# Patient Record
Sex: MALE | Race: BLACK OR AFRICAN AMERICAN | NOT HISPANIC OR LATINO | URBAN - METROPOLITAN AREA
[De-identification: names, ages, dates, MRNs, and addresses within clinical notes are randomized per-mention and may not be internally consistent; named-entity substitution may affect disease eponyms.]

---

## 2024-01-28 ENCOUNTER — HOSPITAL ENCOUNTER (EMERGENCY)
Facility: HOSPITAL | Age: 54
Discharge: LEFT BEFORE DISCHARGE | End: 2024-01-28
Payer: COMMERCIAL

## 2024-01-28 VITALS
OXYGEN SATURATION: 95 % | BODY MASS INDEX: 33.33 KG/M2 | DIASTOLIC BLOOD PRESSURE: 107 MMHG | HEIGHT: 69 IN | SYSTOLIC BLOOD PRESSURE: 196 MMHG | WEIGHT: 225 LBS | HEART RATE: 87 BPM | RESPIRATION RATE: 22 BRPM | TEMPERATURE: 98.1 F

## 2024-01-28 PROCEDURE — 4500999001 HC ED NO CHARGE

## 2024-01-28 ASSESSMENT — COLUMBIA-SUICIDE SEVERITY RATING SCALE - C-SSRS
6. HAVE YOU EVER DONE ANYTHING, STARTED TO DO ANYTHING, OR PREPARED TO DO ANYTHING TO END YOUR LIFE?: NO
1. IN THE PAST MONTH, HAVE YOU WISHED YOU WERE DEAD OR WISHED YOU COULD GO TO SLEEP AND NOT WAKE UP?: NO
2. HAVE YOU ACTUALLY HAD ANY THOUGHTS OF KILLING YOURSELF?: NO

## 2024-01-28 NOTE — ED TRIAGE NOTES
PT presents to ED via triage for chief complaint of SOB. PT states he developed SOB 1 hour prior to arrival. PT denies chest pain. PT also endorsing left eye photophobia. PT declining to answer RN questions in triage. PT Aox4 and ambulates on his own.

## 2024-03-09 ENCOUNTER — HOSPITAL ENCOUNTER (EMERGENCY)
Facility: HOSPITAL | Age: 54
Discharge: HOME | End: 2024-03-09
Attending: EMERGENCY MEDICINE
Payer: COMMERCIAL

## 2024-03-09 ENCOUNTER — APPOINTMENT (OUTPATIENT)
Dept: RADIOLOGY | Facility: HOSPITAL | Age: 54
End: 2024-03-09
Payer: COMMERCIAL

## 2024-03-09 ENCOUNTER — CLINICAL SUPPORT (OUTPATIENT)
Dept: EMERGENCY MEDICINE | Facility: HOSPITAL | Age: 54
End: 2024-03-09
Payer: COMMERCIAL

## 2024-03-09 VITALS
TEMPERATURE: 97.8 F | BODY MASS INDEX: 29.4 KG/M2 | DIASTOLIC BLOOD PRESSURE: 83 MMHG | SYSTOLIC BLOOD PRESSURE: 169 MMHG | HEIGHT: 71 IN | WEIGHT: 210 LBS | HEART RATE: 60 BPM | RESPIRATION RATE: 16 BRPM | OXYGEN SATURATION: 97 %

## 2024-03-09 DIAGNOSIS — S01.81XA FACE LACERATIONS, INITIAL ENCOUNTER: Primary | ICD-10-CM

## 2024-03-09 LAB
ALBUMIN SERPL BCP-MCNC: 4 G/DL (ref 3.4–5)
ALP SERPL-CCNC: 61 U/L (ref 33–120)
ALT SERPL W P-5'-P-CCNC: 37 U/L (ref 10–52)
ANION GAP SERPL CALC-SCNC: 13 MMOL/L (ref 10–20)
APAP SERPL-MCNC: <10 UG/ML
AST SERPL W P-5'-P-CCNC: 37 U/L (ref 9–39)
BASOPHILS # BLD AUTO: 0.04 X10*3/UL (ref 0–0.1)
BASOPHILS NFR BLD AUTO: 0.7 %
BILIRUB SERPL-MCNC: 0.3 MG/DL (ref 0–1.2)
BUN SERPL-MCNC: 17 MG/DL (ref 6–23)
CALCIUM SERPL-MCNC: 8.7 MG/DL (ref 8.6–10.6)
CHLORIDE SERPL-SCNC: 102 MMOL/L (ref 98–107)
CO2 SERPL-SCNC: 25 MMOL/L (ref 21–32)
CREAT SERPL-MCNC: 1.21 MG/DL (ref 0.5–1.3)
EGFRCR SERPLBLD CKD-EPI 2021: 72 ML/MIN/1.73M*2
EOSINOPHIL # BLD AUTO: 0.11 X10*3/UL (ref 0–0.7)
EOSINOPHIL NFR BLD AUTO: 1.9 %
ERYTHROCYTE [DISTWIDTH] IN BLOOD BY AUTOMATED COUNT: 13.3 % (ref 11.5–14.5)
ETHANOL SERPL-MCNC: 455 MG/DL
GLUCOSE BLD MANUAL STRIP-MCNC: 115 MG/DL (ref 74–99)
GLUCOSE SERPL-MCNC: 115 MG/DL (ref 74–99)
HCT VFR BLD AUTO: 43.1 % (ref 41–52)
HGB BLD-MCNC: 15.3 G/DL (ref 13.5–17.5)
IMM GRANULOCYTES # BLD AUTO: 0.01 X10*3/UL (ref 0–0.7)
IMM GRANULOCYTES NFR BLD AUTO: 0.2 % (ref 0–0.9)
LYMPHOCYTES # BLD AUTO: 2.76 X10*3/UL (ref 1.2–4.8)
LYMPHOCYTES NFR BLD AUTO: 48.6 %
MCH RBC QN AUTO: 30.9 PG (ref 26–34)
MCHC RBC AUTO-ENTMCNC: 35.5 G/DL (ref 32–36)
MCV RBC AUTO: 87 FL (ref 80–100)
MONOCYTES # BLD AUTO: 0.44 X10*3/UL (ref 0.1–1)
MONOCYTES NFR BLD AUTO: 7.7 %
NEUTROPHILS # BLD AUTO: 2.32 X10*3/UL (ref 1.2–7.7)
NEUTROPHILS NFR BLD AUTO: 40.9 %
NRBC BLD-RTO: 0 /100 WBCS (ref 0–0)
PLATELET # BLD AUTO: 236 X10*3/UL (ref 150–450)
POTASSIUM SERPL-SCNC: 2.9 MMOL/L (ref 3.5–5.3)
PROT SERPL-MCNC: 7.3 G/DL (ref 6.4–8.2)
RBC # BLD AUTO: 4.95 X10*6/UL (ref 4.5–5.9)
SALICYLATES SERPL-MCNC: <3 MG/DL
SODIUM SERPL-SCNC: 137 MMOL/L (ref 136–145)
WBC # BLD AUTO: 5.7 X10*3/UL (ref 4.4–11.3)

## 2024-03-09 PROCEDURE — 76376 3D RENDER W/INTRP POSTPROCES: CPT

## 2024-03-09 PROCEDURE — 90715 TDAP VACCINE 7 YRS/> IM: CPT | Mod: SE

## 2024-03-09 PROCEDURE — 2500000005 HC RX 250 GENERAL PHARMACY W/O HCPCS: Mod: SE | Performed by: STUDENT IN AN ORGANIZED HEALTH CARE EDUCATION/TRAINING PROGRAM

## 2024-03-09 PROCEDURE — 74177 CT ABD & PELVIS W/CONTRAST: CPT

## 2024-03-09 PROCEDURE — 72125 CT NECK SPINE W/O DYE: CPT

## 2024-03-09 PROCEDURE — 82947 ASSAY GLUCOSE BLOOD QUANT: CPT

## 2024-03-09 PROCEDURE — 82947 ASSAY GLUCOSE BLOOD QUANT: CPT | Mod: 59

## 2024-03-09 PROCEDURE — 2500000001 HC RX 250 WO HCPCS SELF ADMINISTERED DRUGS (ALT 637 FOR MEDICARE OP): Mod: SE | Performed by: STUDENT IN AN ORGANIZED HEALTH CARE EDUCATION/TRAINING PROGRAM

## 2024-03-09 PROCEDURE — 72131 CT LUMBAR SPINE W/O DYE: CPT | Mod: RCN

## 2024-03-09 PROCEDURE — 72128 CT CHEST SPINE W/O DYE: CPT | Mod: RCN

## 2024-03-09 PROCEDURE — 2550000001 HC RX 255 CONTRASTS: Mod: SE | Performed by: EMERGENCY MEDICINE

## 2024-03-09 PROCEDURE — 70486 CT MAXILLOFACIAL W/O DYE: CPT

## 2024-03-09 PROCEDURE — 2500000004 HC RX 250 GENERAL PHARMACY W/ HCPCS (ALT 636 FOR OP/ED): Mod: SE | Performed by: STUDENT IN AN ORGANIZED HEALTH CARE EDUCATION/TRAINING PROGRAM

## 2024-03-09 PROCEDURE — 80143 DRUG ASSAY ACETAMINOPHEN: CPT | Performed by: STUDENT IN AN ORGANIZED HEALTH CARE EDUCATION/TRAINING PROGRAM

## 2024-03-09 PROCEDURE — 85025 COMPLETE CBC W/AUTO DIFF WBC: CPT | Performed by: STUDENT IN AN ORGANIZED HEALTH CARE EDUCATION/TRAINING PROGRAM

## 2024-03-09 PROCEDURE — 2500000004 HC RX 250 GENERAL PHARMACY W/ HCPCS (ALT 636 FOR OP/ED): Mod: SE

## 2024-03-09 PROCEDURE — 99285 EMERGENCY DEPT VISIT HI MDM: CPT | Mod: 25

## 2024-03-09 PROCEDURE — 74177 CT ABD & PELVIS W/CONTRAST: CPT | Performed by: RADIOLOGY

## 2024-03-09 PROCEDURE — 90471 IMMUNIZATION ADMIN: CPT

## 2024-03-09 PROCEDURE — 36415 COLL VENOUS BLD VENIPUNCTURE: CPT | Performed by: STUDENT IN AN ORGANIZED HEALTH CARE EDUCATION/TRAINING PROGRAM

## 2024-03-09 PROCEDURE — 70450 CT HEAD/BRAIN W/O DYE: CPT

## 2024-03-09 PROCEDURE — 80053 COMPREHEN METABOLIC PANEL: CPT | Performed by: STUDENT IN AN ORGANIZED HEALTH CARE EDUCATION/TRAINING PROGRAM

## 2024-03-09 PROCEDURE — 12013 RPR F/E/E/N/L/M 2.6-5.0 CM: CPT | Performed by: STUDENT IN AN ORGANIZED HEALTH CARE EDUCATION/TRAINING PROGRAM

## 2024-03-09 PROCEDURE — 93005 ELECTROCARDIOGRAM TRACING: CPT | Mod: 59

## 2024-03-09 PROCEDURE — 2500000001 HC RX 250 WO HCPCS SELF ADMINISTERED DRUGS (ALT 637 FOR MEDICARE OP): Mod: SE | Performed by: EMERGENCY MEDICINE

## 2024-03-09 PROCEDURE — 12013 RPR F/E/E/N/L/M 2.6-5.0 CM: CPT | Performed by: EMERGENCY MEDICINE

## 2024-03-09 PROCEDURE — 99285 EMERGENCY DEPT VISIT HI MDM: CPT | Performed by: EMERGENCY MEDICINE

## 2024-03-09 PROCEDURE — 71260 CT THORAX DX C+: CPT | Performed by: RADIOLOGY

## 2024-03-09 RX ORDER — BACITRACIN ZINC 500 UNIT/G
OINTMENT IN PACKET (EA) TOPICAL 3 TIMES DAILY
Status: DISCONTINUED | OUTPATIENT
Start: 2024-03-09 | End: 2024-03-09 | Stop reason: HOSPADM

## 2024-03-09 RX ORDER — POTASSIUM CHLORIDE 1.5 G/1.58G
40 POWDER, FOR SOLUTION ORAL ONCE
Status: COMPLETED | OUTPATIENT
Start: 2024-03-09 | End: 2024-03-09

## 2024-03-09 RX ORDER — LIDOCAINE HYDROCHLORIDE 10 MG/ML
10 INJECTION INFILTRATION; PERINEURAL ONCE
Status: COMPLETED | OUTPATIENT
Start: 2024-03-09 | End: 2024-03-09

## 2024-03-09 RX ORDER — ACETAMINOPHEN 325 MG/1
975 TABLET ORAL ONCE
Status: COMPLETED | OUTPATIENT
Start: 2024-03-09 | End: 2024-03-09

## 2024-03-09 RX ORDER — ACETAMINOPHEN 325 MG/1
TABLET ORAL
Status: COMPLETED
Start: 2024-03-09 | End: 2024-03-09

## 2024-03-09 RX ORDER — POTASSIUM CHLORIDE 14.9 MG/ML
20 INJECTION INTRAVENOUS
Status: DISPENSED | OUTPATIENT
Start: 2024-03-09 | End: 2024-03-09

## 2024-03-09 RX ADMIN — LIDOCAINE-EPINEPHRINE-TETRACAINE GEL 4-0.05-0.5% 1 APPLICATION: 4-0.05-0.5 GEL at 02:50

## 2024-03-09 RX ADMIN — LIDOCAINE HYDROCHLORIDE 100 MG: 10 INJECTION, SOLUTION INFILTRATION; PERINEURAL at 06:01

## 2024-03-09 RX ADMIN — BACITRACIN 1 APPLICATION: 500 OINTMENT TOPICAL at 09:00

## 2024-03-09 RX ADMIN — ACETAMINOPHEN 975 MG: 325 TABLET ORAL at 09:58

## 2024-03-09 RX ADMIN — TETANUS TOXOID, REDUCED DIPHTHERIA TOXOID AND ACELLULAR PERTUSSIS VACCINE, ADSORBED 0.5 ML: 5; 2.5; 8; 8; 2.5 SUSPENSION INTRAMUSCULAR at 10:00

## 2024-03-09 RX ADMIN — IOHEXOL 100 ML: 350 INJECTION, SOLUTION INTRAVENOUS at 04:46

## 2024-03-09 RX ADMIN — POTASSIUM CHLORIDE 40 MEQ: 1.5 POWDER, FOR SOLUTION ORAL at 07:52

## 2024-03-09 ASSESSMENT — COLUMBIA-SUICIDE SEVERITY RATING SCALE - C-SSRS
2. HAVE YOU ACTUALLY HAD ANY THOUGHTS OF KILLING YOURSELF?: NO
1. IN THE PAST MONTH, HAVE YOU WISHED YOU WERE DEAD OR WISHED YOU COULD GO TO SLEEP AND NOT WAKE UP?: NO
6. HAVE YOU EVER DONE ANYTHING, STARTED TO DO ANYTHING, OR PREPARED TO DO ANYTHING TO END YOUR LIFE?: NO

## 2024-03-09 NOTE — Clinical Note
John Carreon was seen and treated in our emergency department on 3/9/2024.  He may return to work on 03/11/2024.       If you have any questions or concerns, please don't hesitate to call.      Sabine Haq, DO
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
## 2024-03-09 NOTE — ED PROVIDER NOTES
History of Present Illness   CC: Fall and Alcohol Intoxication     History provided by: Patient and EMS  Limitations to History: Intoxication    HPI:  John Carreon is a 53 y.o. male with history of hypertension presenting to the emergency department after being found down on the sidewalk, clinically intoxicated.  Brought in by EMS after bystanders found him on the ground and called 911.  He has a superficial laceration to the right eyebrow.  He is quite intoxicated which limits history.  He is unsure of how he got to the emergency department.  Denies any pain at this time.    External Records Reviewed: None    Physical Exam   Triage vitals:  T 36.6 °C (97.8 °F)  HR 91  BP (!) 132/103  RR 16  O2 96 % None (Room air)    Vital signs reviewed in nursing triage note, EMR flow sheets, and at patient's bedside.   General: Awake, clinically intoxicated  Eyes: Gaze conjugate.  No scleral icterus or injection.  Pupils equal, round, and reactive to light bilaterally.  HENT: There is a 4 cm linear laceration to the right anterolateral eyebrow.  Minimal bruising noted.  Otherwise no skull hematomas.  Midface stable.  No mandibular tenderness.  No blood in the mouth.  CV: Regular rate, Regular rhythm. Radial, DP pulses 2+ bilaterally  Resp: Breathing non-labored, speaking in full sentences.  Clear to auscultation bilaterally.  No chest wall tenderness to palpation  GI: Soft, non-distended, non-tender. No rebound or guarding.  MSK/Extremities: No gross bony deformities. Moving all extremities no tenderness to palpation in all 4 extremities.  No tenderness palpation of the C/T/L-spine.  Pelvis stable to compression.  Moving all extremities appropriately.  Skin: Warm. Appropriate color  Neuro: Awake, alert, quite intoxicated with dysarthric speech.  Moving all extremities.     ED Course & Medical Decision Making   ED Course:  ED Course as of 03/09/24 2044   Sat Mar 09, 2024   0413 CBC and Auto Differential  Normal white blood  cell count, normal hemoglobin, normal platelets []   0413 Comprehensive metabolic panel(!!)  Normal glucose, mild hypokalemia, normal bicarb, normal renal functions, normal LFTs []   0413 Acute Toxicology Panel, Blood(!!) []   0522 CT chest abdomen pelvis w IV contrast  CT chest and pelvis without acute traumatic injury [SH]   0522 CT head wo IV contrast  CT head negative [SH]   0522 CT cervical spine wo IV contrast  CT C-spine negative [SH]   0523 CT maxillofacial bones wo IV contrast  CT facial bones negative [SH]   0657 ECG 12 lead  EKG obtained at 0308 demonstrating normal sinus rhythm with rate of 83, normal axis, normal intervals, no ST segment or T wave signs of ischemia.  He does have U waves present. []      ED Course User Index  [] Ze Knight MD         Diagnoses as of 03/09/24 2044   Face lacerations, initial encounter       Differential diagnoses considered include but are not limited to: Intracranial hemorrhage, hypoglycemia, alcohol intoxication, traumatic injury, laceration    Social Determinants Limiting Care: None identified    MDM:  53 y.o. male presenting to the emergency department after being found down on the ground intoxicated.  On arrival vital signs within normal limits, patient quite clinically intoxicated, requiring frequent redirection to stay in bed.  Will obtain pan CT given his obvious traumatic injury to the face.  Will obtain labs, check his blood sugar.  Will continue to monitor the patient.  Currently, he is staying in bed, and responding to verbal redirection, but he does not have capacity to refuse workup or leave AMA at this time.    Patient CT scans demonstrated findings consistent with his laceration, but no severe traumatic injuries.  Laceration was repaired as noted below.  His lab work was notable for hypokalemia.  We started IV repletion, but the patient ripped out his IV.  He remains calm when left alone in bed, but is agitated by any medical interventions.   Given he is stable at this time, we will allow him to remain IV free, and plan to replete his potassium orally once clinically sober.  Patient signed out at 7 AM pending evaluation once clinically sober.    Disposition   Patient was signed out at 0700 pending completion of their work-up.  Please see the next provider's transition of care note for the remainder of the patient's care.    Procedures   Laceration Repair    Performed by: Ze Knight MD  Authorized by: Sabine Haq DO    Consent:     Consent obtained:  Verbal  Anesthesia:     Anesthesia method:  Topical application and local infiltration    Topical anesthetic:  LET    Local anesthetic:  Lidocaine 1% w/o epi  Laceration details:     Location:  Face    Face location:  R eyebrow    Length (cm):  4  Pre-procedure details:     Preparation:  Imaging obtained to evaluate for foreign bodies  Exploration:     Limited defect created (wound extended): no      Hemostasis achieved with:  Direct pressure    Imaging obtained comment:  CT maxillofacial    Imaging outcome: foreign body not noted      Contaminated: no    Treatment:     Irrigation solution:  Sterile saline    Irrigation volume:  150 cc    Irrigation method:  Pressure wash    Visualized foreign bodies/material removed: no      Debridement:  None    Undermining:  None    Scar revision: no    Skin repair:     Repair method:  Sutures    Suture size:  5-0    Suture material:  Fast-absorbing gut    Suture technique:  Simple interrupted    Number of sutures:  4  Approximation:     Approximation:  Close  Repair type:     Repair type:  Simple  Post-procedure details:     Dressing:  Antibiotic ointment    Procedure completion:  Tolerated well, no immediate complications      Patient seen and discussed with ED attending physician.    Osbaldo Knight MD  PGY 3 Emergency Medicine         Ze Knight MD  Resident  03/09/24 6310
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
## 2024-03-12 LAB
ATRIAL RATE: 83 BPM
P AXIS: 60 DEGREES
P OFFSET: 199 MS
P ONSET: 134 MS
PR INTERVAL: 160 MS
Q ONSET: 214 MS
QRS COUNT: 14 BEATS
QRS DURATION: 96 MS
QT INTERVAL: 370 MS
QTC CALCULATION(BAZETT): 434 MS
QTC FREDERICIA: 412 MS
R AXIS: 48 DEGREES
T AXIS: 30 DEGREES
T OFFSET: 399 MS
VENTRICULAR RATE: 83 BPM

## 2024-05-26 ENCOUNTER — HOSPITAL ENCOUNTER (EMERGENCY)
Facility: HOSPITAL | Age: 54
Discharge: HOME | End: 2024-05-26
Attending: EMERGENCY MEDICINE
Payer: COMMERCIAL

## 2024-05-26 VITALS
OXYGEN SATURATION: 93 % | SYSTOLIC BLOOD PRESSURE: 131 MMHG | TEMPERATURE: 97.2 F | RESPIRATION RATE: 18 BRPM | DIASTOLIC BLOOD PRESSURE: 91 MMHG | HEART RATE: 89 BPM

## 2024-05-26 DIAGNOSIS — T51.91XA UNINTENTIONAL POISONING BY ALCOHOL, INITIAL ENCOUNTER: Primary | ICD-10-CM

## 2024-05-26 LAB — GLUCOSE BLD MANUAL STRIP-MCNC: 112 MG/DL (ref 74–99)

## 2024-05-26 PROCEDURE — 99283 EMERGENCY DEPT VISIT LOW MDM: CPT

## 2024-05-26 PROCEDURE — 82947 ASSAY GLUCOSE BLOOD QUANT: CPT

## 2024-05-26 PROCEDURE — 2500000004 HC RX 250 GENERAL PHARMACY W/ HCPCS (ALT 636 FOR OP/ED): Mod: SE

## 2024-05-26 PROCEDURE — 99284 EMERGENCY DEPT VISIT MOD MDM: CPT | Performed by: EMERGENCY MEDICINE

## 2024-05-26 PROCEDURE — 96372 THER/PROPH/DIAG INJ SC/IM: CPT

## 2024-05-26 RX ORDER — AMLODIPINE BESYLATE 10 MG/1
10 TABLET ORAL DAILY
COMMUNITY

## 2024-05-26 RX ORDER — DROPERIDOL 2.5 MG/ML
INJECTION, SOLUTION INTRAMUSCULAR; INTRAVENOUS
Status: COMPLETED
Start: 2024-05-26 | End: 2024-05-26

## 2024-05-26 RX ORDER — DROPERIDOL 2.5 MG/ML
5 INJECTION, SOLUTION INTRAMUSCULAR; INTRAVENOUS ONCE
Status: COMPLETED | OUTPATIENT
Start: 2024-05-26 | End: 2024-05-26

## 2024-05-26 RX ADMIN — DROPERIDOL 5 MG: 2.5 INJECTION, SOLUTION INTRAMUSCULAR; INTRAVENOUS at 01:16

## 2024-05-26 ASSESSMENT — COLUMBIA-SUICIDE SEVERITY RATING SCALE - C-SSRS
1. IN THE PAST MONTH, HAVE YOU WISHED YOU WERE DEAD OR WISHED YOU COULD GO TO SLEEP AND NOT WAKE UP?: NO
6. HAVE YOU EVER DONE ANYTHING, STARTED TO DO ANYTHING, OR PREPARED TO DO ANYTHING TO END YOUR LIFE?: NO
2. HAVE YOU ACTUALLY HAD ANY THOUGHTS OF KILLING YOURSELF?: NO

## 2024-05-26 ASSESSMENT — PAIN - FUNCTIONAL ASSESSMENT: PAIN_FUNCTIONAL_ASSESSMENT: UNABLE TO SELF-REPORT
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
## 2024-05-26 NOTE — PROGRESS NOTES
Pharmacy Medication History Review    John Carreon is a 54 y.o. male admitted for No Principal Problem: There is no principal problem currently on the Problem List. Please update the Problem List and refresh.. Pharmacy reviewed the patient's [id number] medications and allergies for accuracy.    The list below reflects the updated PTA list. Comments regarding how patient may be taking medications differently can be found in the Admit Orders Activity  Prior to Admission Medications   Prescriptions Last Dose Informant   amLODIPine (Norvasc) 10 mg tablet 5/25/2024 Self   Sig: Take 1 tablet (10 mg) by mouth once daily.      Facility-Administered Medications: None        The list below reflects the updated allergy list. Please review each documented allergy for additional clarification and justification.  Allergies  Reviewed by Alban Lira on 5/26/2024   No Known Allergies         Patient accepts M2B at discharge. Pharmacy has been updated to [facility].    Sources used to complete the med history include   5/26/2024 [facility] clinical summary   Epic allergy list   Allergy list sure scripts   Patient interview     Below are additional concerns with the patient's PTA list.  Patient is an excellent historian - patient knows medication name dose frequency and indication   Patient has no recent fill history for amlodipine 10 mg however patient states that he is taking surplus supply of this medication     Alban Lira [facility] [place]  Transitions of Care Pharmacy Technician  [facility]  Please reach out via "[employer] Secure Chat for questions, or if no response call [employer] or [employer] “[facility]”

## 2024-05-26 NOTE — Clinical Note
John Carreon was seen and treated in our emergency department on 5/26/2024.  He may return to work on 05/27/2024.       If you have any questions or concerns, please don't hesitate to call.      Deon Valderrama MD
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
## 2025-05-20 ENCOUNTER — HOSPITAL ENCOUNTER (EMERGENCY)
Facility: HOSPITAL | Age: 55
Discharge: HOME | End: 2025-05-20
Attending: EMERGENCY MEDICINE
Payer: COMMERCIAL

## 2025-05-20 VITALS
SYSTOLIC BLOOD PRESSURE: 158 MMHG | HEIGHT: 71 IN | RESPIRATION RATE: 18 BRPM | BODY MASS INDEX: 23.8 KG/M2 | HEART RATE: 75 BPM | DIASTOLIC BLOOD PRESSURE: 102 MMHG | OXYGEN SATURATION: 97 % | WEIGHT: 170 LBS | TEMPERATURE: 98.2 F

## 2025-05-20 DIAGNOSIS — D17.22 LIPOMA OF LEFT UPPER EXTREMITY: Primary | ICD-10-CM

## 2025-05-20 PROCEDURE — 99281 EMR DPT VST MAYX REQ PHY/QHP: CPT | Performed by: EMERGENCY MEDICINE

## 2025-05-20 PROCEDURE — 99283 EMERGENCY DEPT VISIT LOW MDM: CPT | Performed by: EMERGENCY MEDICINE

## 2025-05-20 ASSESSMENT — PAIN SCALES - GENERAL: PAINLEVEL_OUTOF10: 0 - NO PAIN

## 2025-05-20 ASSESSMENT — COLUMBIA-SUICIDE SEVERITY RATING SCALE - C-SSRS
2. HAVE YOU ACTUALLY HAD ANY THOUGHTS OF KILLING YOURSELF?: NO
6. HAVE YOU EVER DONE ANYTHING, STARTED TO DO ANYTHING, OR PREPARED TO DO ANYTHING TO END YOUR LIFE?: NO
1. IN THE PAST MONTH, HAVE YOU WISHED YOU WERE DEAD OR WISHED YOU COULD GO TO SLEEP AND NOT WAKE UP?: NO

## 2025-05-20 ASSESSMENT — PAIN - FUNCTIONAL ASSESSMENT: PAIN_FUNCTIONAL_ASSESSMENT: 0-10

## 2025-05-20 NOTE — ED TRIAGE NOTES
Patient reports he has a bump on his left arm and a few bumps on his left leg that have been there for months. States they do not hurt or itch, but he wants to know what they are.

## 2025-05-20 NOTE — ED PROVIDER NOTES
"EMERGENCY DEPARTMENT ENCOUNTER      Pt Name: John Carreon  MRN: [id number]  Birthdate 1970  Date of evaluation: 5/20/2025  Provider: Milind Gordon MD    CHIEF COMPLAINT       Chief Complaint   Patient presents with    Mass     HISTORY OF PRESENT ILLNESS    John Carreon is a 55 y.o. year old male who presents to the ER for multiple masses.  The patient reports that he has noticed a few masses on his left arm and legs.  The patient denies any insect bites, injuries, lacerations.  He denies any fevers or chills, he denies nausea or vomiting, bleeding from the masses, itching, or rapid growth of the masses.  He came to the ED to evaluate what they are.    PMH is significant for hypertension, alcohol use disorder.     PAST MEDICAL HISTORY   Medical History[1]  CURRENT MEDICATIONS       Previous Medications    AMLODIPINE (NORVASC) 10 MG TABLET    Take 1 tablet (10 mg) by mouth once daily.     SURGICAL HISTORY     Surgical History[2]  ALLERGIES     Patient has no known allergies.  FAMILY HISTORY     Family History[3]  SOCIAL HISTORY     Social History[4]  PHYSICAL EXAM  (up to 7 for level 4, 8 or more for level 5)     ED Triage Vitals [05/20/25 0343]   Temperature Heart Rate Respirations BP   36.8 °C (98.2 °F) 75 18 (!) 158/102      Pulse Ox Temp src Heart Rate Source Patient Position   97 % -- Monitor Sitting      BP Location FiO2 (%)     Left arm --       Physical Exam   DIAGNOSTIC RESULTS   LABS:  Labs Reviewed - No data to display  All other labs were within normal range or not returned as of this dictation.  Imaging  No orders to display      Procedure  Procedures  EMERGENCY DEPARTMENT COURSE/MDM:   Medical Decision Making    Vitals:    Vitals:    05/20/25 0343   BP: (!) 158/102   BP Location: Left arm   Patient Position: Sitting   Pulse: 75   Resp: 18   Temp: 36.8 °C (98.2 °F)   SpO2: 97%   Weight: 77.1 kg (170 lb)   Height: 1.803 m (5' 11\")     John Carreon is a male 55 y.o. who presents to the ER for " masses. On arrival the patients vital signs were: Afebrile, regular heart rate, normotensive, regular respiration rate, normoxic on room air. History obtained from: patient.     Physical exam is significant for multiple small lipomas in the left upper extremity and lower legs.  The masses are small, approximately 0.5 cm in diameter, rubbery, mobile, nontender to palpation.  There is no lymphadenopathy noted on examination.  Heart is in regular rate and rhythm, no murmurs were appreciated on examination.  The lungs are clear to auscultation.  The abdomen is soft nontender, no masses or hernias were appreciated on examination.  There is no lower extremity swelling or injuries noted.    The patient was offered referral to a general surgeon, he declined.    The patient was discharged and given return precautions. The patient was instructed to follow up with and with their PCP in one week. The patient understood and was agreeable with the plan.       Diagnoses as of 05/20/25 0418   Lipoma of left upper extremity     External Records Reviewed: I reviewed recent and relevant outside records including inpatient notes, outpatient records      Shared decision making for disposition  Patient and/or patient´s representative was counseled regarding labs, imaging, likely diagnosis. All questions were answered. Recommendation was made   for discharge home. The patient agreed and was discharged home in stable condition with appropriate relevant educational materials. Return precautions were provided which included increasing pain, numbness, tingling, weakness, loss of motion in your arms or legs, loss of control of your urine or stool, fever, abdominal pain, chest pain, shortness of breath, or any new or worsening symptoms..     ED Medications administered this visit:  Medications - No data to display    New Prescriptions from this visit:    New Prescriptions    No medications on file       Follow-up:  No follow-up provider  specified.      Final Impression:   1. Lipoma of left upper extremity          Please excuse any misspellings or unintended errors related to the Dragon speech recognition software used to dictate this note.    I reviewed the case with the attending ED physician. The attending ED physician agrees with the plan.        [1] No past medical history on file.  [2] No past surgical history on file.  [3] No family history on file.  [4]   Social History  Tobacco Use    Smoking status: Unknown    Smokeless tobacco: Never   Substance Use Topics    Alcohol use: Not on file    Drug use: Not on file        Milind Gorodn MD  Resident  05/20/25 0418

## 2025-05-20 NOTE — DISCHARGE INSTRUCTIONS
You are seen in the ED for your masses.  It is a lipoma, collection of fat cells.  It can grow but typically they do not.  It does not spread, it is not one of the harmful cancers.  You can get it removed if it bothers you by the general surgeon.    Please return to the ED if you have fever greater than 101 °F that does not get better with Tylenol or Motrin, if you have mass that blocks your airway, if you cannot breathe, if your speech changes, if you have more than 20 pounds loss in the month, night sweats, if you start coughing up blood, and if you have any other concerns.

## 2025-05-30 ENCOUNTER — HOSPITAL ENCOUNTER (EMERGENCY)
Facility: HOSPITAL | Age: 55
Discharge: HOME | End: 2025-05-30
Payer: COMMERCIAL

## 2025-05-30 PROCEDURE — 4500999001 HC ED NO CHARGE
